# Patient Record
Sex: MALE | Race: WHITE
[De-identification: names, ages, dates, MRNs, and addresses within clinical notes are randomized per-mention and may not be internally consistent; named-entity substitution may affect disease eponyms.]

---

## 2018-05-16 NOTE — XRAY REPORT
FLUOROSCOPICALLY-GUIDED RIGHT SHOULDER INJECTION FOR MR ARTHROGRAM:  05/16/2018

 

CLINICAL INDICATION:  Pain.

 

TECHNIQUE/FINDINGS:  Following obtaining informed consent, the patient's right shoulder was 

prepped and draped in the usual sterile fashion.  The skin and soft tissues were anesthetized 

with lidocaine.  A spinal needle was inserted into the right glenohumeral joint, and a 

combination of iodinated contrast, dilute gadolinium, and lidocaine was injected 

intraarticularly.  The patient tolerated the procedure well.  No immediate complications.  Spot

image reveals no evidence of contrast extravasation.

 

IMPRESSION:  SUCCESSFUL RIGHT SHOULDER INJECTION FOR MR ARTHROGRAM.

 

FLUOROSCOPY TIME:  19 seconds; one spot image obtained.

 

 

DD: 05/16/2018 14:50

TD: 05/16/2018 14:57

Job #: [id number]

## 2018-05-17 NOTE — MRI REPORT
EXAM:

RIGHT SHOULDER MRI ARTHROGRAM WITH CONTRAST

 

EXAM DATE: 5/16/2018 01:30 PM.

 

CLINICAL HISTORY: Right shoulder pain.

 

COMPARISON: None.

 

TECHNIQUE: Multiplanar, multisequence T1-weighted and fluid-sensitive sequences of the shoulder after
 an arthrographic injection of dilute gadolinium, dictated under a separate exam. Other: None.

 

FINDINGS: 

Rotator cuff: Soft tissue thickening and edema involving distal fibers of the infraspinatus. No rotat
or cuff tear identified. No rotator cuff muscle atrophy or fatty replacement.

 

Long head biceps tendon: Intact inserting normal course, signal and morphology.

 

Labrum: Small ill-defined linear contrast filling tear at the posterior labrum with punctate posterio
r paralabral cyst. Remainder of the labrum appears intact.

 

Bones and articular symphysis: No significant articular cartilage defects are seen.

 

Acromioclavicular joint: Mild degenerative change. Tenting to the acromion.

 

IMPRESSION: 

1. Small focal tear at the mid posterior labrum with tiny paralabral cyst formation. 

2. Mild infraspinatus tendinosis. 

3. Mild degenerative change at the acromioclavicular joint.

 

[facility]

Referring Provider Line: [phone]

 

SITE ID: [id number]

## 2018-10-10 NOTE — XRAY REPORT
Reason:  constipation, pain

Procedure Date:  10/10/2018   

Accession Number:  [id number] / [id number]                    

Procedure:  XR  - Abdomen 2 View X-Ray CPT Code:  38147

 

FULL RESULT:

 

 

EXAM:

ABDOMEN RADIOGRAPHY

 

EXAM DATE: 10/10/2018 08:08 AM.

 

CLINICAL HISTORY: Constipation, abdominal pain.

 

COMPARISON: None.

 

TECHNIQUE: 2 views.

 

FINDINGS:

Lung Bases: Unremarkable.

 

Bowel Gas Pattern: No dilated gas-filled loops of small bowel. No 

abnormal air-fluid levels. There is an above average amount of formed 

stool throughout the colon and rectum.

 

Free Air: None.

 

Other: No abnormal intra-abdominal calcification or mass-effect. No acute 

osseous abnormality.

IMPRESSION: Above average amount of formed stool throughout the colon and 

rectum.

 

[facility]

## 2018-10-10 NOTE — ED PHYSICIAN DOCUMENTATION
PD HPI ABD PAIN





- Stated complaint


Stated Complaint: CONSTIPATION/POST SURGERY





- Chief complaint


Chief Complaint: Abd Pain





- History obtained from


History obtained from: Patient





- History of Present Illness


Timing - onset: How many days ago (6 days), Other (since on oxycodone 10 mg 

every 4 hours post op right shoulder surgery)


Timing - duration: Days (6)


Timing - details: Gradual onset, Still present, Waxing and waning, Still present

in ED


Pain level max: 5


Pain level now: 5


Quality: Cramping, Pain.  No: Fullness/distended





Review of Systems


Ten Systems: 10 systems reviewed and negative


Constitutional: denies: Fever, Weight Loss


Cardiac: denies: Chest pain / pressure


Respiratory: denies: Dyspnea


GI: reports: Abdominal Pain, Constipation.  denies: Abdominal Swelling, Nausea, 

Vomiting, Diarrhea


: denies: Dysuria


Musculoskeletal: denies: Back pain





PD PAST MEDICAL HISTORY





- Past Medical History


Past Medical History: No





- Past Surgical History


Past Surgical History: Yes


Ortho: Shoulder arthroplasty


HEENT: Tonsil/Adenoidectomy





- Present Medications


Home Medications: 


                                Ambulatory Orders











 Medication  Instructions  Recorded  Confirmed


 


Acetaminophen [Tylenol] 650 mg PO Q6H PRN 10/10/18 10/10/18














- Allergies


Allergies/Adverse Reactions: 


                                    Allergies











Allergy/AdvReac Type Severity Reaction Status Date / Time


 


Penicillins AdvReac  Respiratory Verified 10/10/18 07:26














- Living Situation


Living Situation: reports: With family


Living Arrangement: reports: At home





- Social History


Does the pt smoke?: Yes


Smoking Status: Current some day smoker





- Family History


Family history: reports: Non contributory





PD ED PE NORMAL





- Vitals


Vital signs reviewed: Yes





- General


General: Alert and oriented X 3, No acute distress, Well developed/nourished





- HEENT


HEENT: PERRL, Moist mucous membranes





- Neck


Neck: Supple, no meningeal sign





- Cardiac


Cardiac: RRR, No murmur





- Respiratory


Respiratory: No respiratory distress, Clear bilaterally





- Abdomen


Abdomen: Normal bowel sounds, Soft, Non tender, Non distended





- Male 


Male : Chaperone present (pt's wife at the bedside)





- Rectal


Rectal: Other (nontender rectal exam. no mass. +mildly hard brown stool further 

up pt's rectum. attempted to digitally disimpact pt but unable to release pt's 

brown stool. occult blood test negative.)





- Back


Back: No CVA TTP





- Derm


Derm: Warm and dry





- Extremities


Extremities: No deformity





- Neuro


Neuro: Alert and oriented X 3





- Psych


Psych: Normal mood, Normal affect





Results





- Vitals


Vitals: 


                               Vital Signs - 24 hr











  10/10/18





  07:24


 


Temperature 36.4 C L


 


Heart Rate 72


 


Respiratory 20





Rate 


 


Blood Pressure 139/75 H


 


O2 Saturation 99








                                     Oxygen











O2 Source                      Room air

















PD MEDICAL DECISION MAKING





- ED course


Complexity details: reviewed results, re-evaluated patient (Pt sitting up on the

 bedside and stated feels better as he had good results from the soap michelle enema 

and wants to go home now.), d/w patient





- Sepsis Event


Vital Signs: 


                               Vital Signs - 24 hr











  10/10/18





  07:24


 


Temperature 36.4 C L


 


Heart Rate 72


 


Respiratory 20





Rate 


 


Blood Pressure 139/75 H


 


O2 Saturation 99








                                     Oxygen











O2 Source                      Room air

















Departure





- Departure


Disposition: 01 Home, Self Care


Clinical Impression: 


Constipation


Qualifiers:


 Constipation type: drug induced constipation Qualified Code(s): K59.03 - Drug 

induced constipation





Condition: Good


Instructions:  ED Constipation


Follow-Up: 


WENDY BONILLA [Primary Care Provider] - Within 1 week


Comments: 


Drink lots of water. Eat high fiber foods. Narcotics like oxycodone can cause 

constipation. OTC stool softener, metamucil. May use OTC Miralax as needed. If 

worse return to the E.R.

## 2022-10-30 ENCOUNTER — HOSPITAL ENCOUNTER (EMERGENCY)
Dept: HOSPITAL 76 - ED | Age: 37
Discharge: HOME | End: 2022-10-30
Payer: COMMERCIAL

## 2022-10-30 VITALS — DIASTOLIC BLOOD PRESSURE: 82 MMHG | SYSTOLIC BLOOD PRESSURE: 135 MMHG

## 2022-10-30 DIAGNOSIS — U07.1: Primary | ICD-10-CM

## 2022-10-30 DIAGNOSIS — F17.200: ICD-10-CM

## 2022-10-30 PROCEDURE — 99283 EMERGENCY DEPT VISIT LOW MDM: CPT

## 2022-10-30 NOTE — ED PHYSICIAN DOCUMENTATION
PD HPI URI





- Stated complaint


Stated Complaint: FEVER/CHILLS





- Chief complaint


Chief Complaint: Resp





- History obtained from


History obtained from: Patient





- History of Present Illness


Timing - onset: Yesterday


Timing details: Gradual onset, Still present


Associated symptoms: Fever, Chills, Nasal congestion, Dry cough.  No: Dyspnea, 

NVD


Contributing factors: Unimmunized, Other (he is moving to [place] for 

reassignment, and is leaving tomorrow driving cross country.).  No: Sick 

contact, Immunocompromised


Similar symptoms before: Has not had sx before


Recently seen: Not recently seen





Review of Systems


Constitutional: reports: Fever, Chills, Myalgias


Nose: reports: Congestion


Throat: denies: Sore throat


Respiratory: reports: Cough


GI: denies: Nausea, Vomiting, Diarrhea


Skin: denies: Rash


Neurologic: reports: Headache.  denies: Generalized weakness, Altered mental 

status





PD PAST MEDICAL HISTORY





- Past Medical History


Cardiovascular: None


Respiratory: None


Endocrine/Autoimmune: None





- Past Surgical History


Past Surgical History: Yes


Ortho: Shoulder arthroplasty


HEENT: Tonsil/Adenoidectomy





- Present Medications


Home Medications: 


                                Ambulatory Orders











 Medication  Instructions  Recorded  Confirmed


 


Nirmatrelvir/Ritonavir [Paxlovid] 1 kit PO BID 5 Days #1 kit 10/30/22 














- Allergies


Allergies/Adverse Reactions: 


                                    Allergies











Allergy/AdvReac Type Severity Reaction Status Date / Time


 


Penicillins AdvReac  Respiratory Verified 10/30/22 12:22














- Social History


Does the pt smoke?: Yes


Smoking Status: Current some day smoker





PD ED PE NORMAL





- Vitals


Vital signs reviewed: Yes





- General


General: Alert and oriented X 3, No acute distress, Well developed/nourished





- Neck


Neck: Supple, no meningeal sign, No adenopathy





- Cardiac


Cardiac: RRR, No murmur





- Respiratory


Respiratory: Clear bilaterally





- Neuro


Neuro: Alert and oriented X 3, No motor deficit, Normal speech





Results





- Vitals


Vitals: 


                               Vital Signs - 24 hr











  10/30/22





  12:19


 


Temperature 38.7 C H


 


Heart Rate 91


 


Respiratory 16





Rate 


 


Blood Pressure 135/82 H


 


O2 Saturation 96








                                     Oxygen











O2 Source                      Room air

















- Labs


Labs: 


                                Laboratory Tests











  10/30/22





  12:25


 


SARS-CoV-2 (PCR)  DETECTED A














PD MEDICAL DECISION MAKING





- ED course


Complexity details: reviewed results (he was discharged prior to results. He was

called when resulted and I transmitted script for Paxlovid to [facility] ([facility]). ), 

considered differential (Patient with an upper respiratory symptoms), d/w 

patient





Departure





- Departure


Disposition: 01 Home, Self Care


Clinical Impression: 


 COVID-19





Upper respiratory infection


Qualifiers:


 URI type: unspecified URI Qualified Code(s): J06.9 - Acute upper respiratory 

infection, unspecified





Condition: Stable


Record reviewed to determine appropriate education?: Yes


Instructions:  ED Viral Syndrome


Prescriptions: 


Nirmatrelvir/Ritonavir [Paxlovid] 1 kit PO BID 5 Days #1 kit


Comments: 


This does sound like a viral type illness.  Your COVID test is pending but not 

resulted as yet.  We will call you with the results when it does come in in the 

next couple of hours or so.  Alternatively you can also find on the patient por

mark lab results.





If its positive you can take the pack Slo-Bid medications twice daily for 5 days

 to help reduce symptoms.  Otherwise stay well-hydrated and Tylenol ibuprofen 

cough medicine if needed if other viral illness.








Good luck on your moving trip.


Discharge Date/Time: 10/30/22 13:53